# Patient Record
Sex: FEMALE | Race: BLACK OR AFRICAN AMERICAN | NOT HISPANIC OR LATINO | URBAN - METROPOLITAN AREA
[De-identification: names, ages, dates, MRNs, and addresses within clinical notes are randomized per-mention and may not be internally consistent; named-entity substitution may affect disease eponyms.]

---

## 2024-11-18 ENCOUNTER — TELEPHONE (OUTPATIENT)
Dept: OBSTETRICS AND GYNECOLOGY | Facility: CLINIC | Age: 34
End: 2024-11-18

## 2024-11-18 NOTE — TELEPHONE ENCOUNTER
Pt called in regards to New Pregnancy Appt.  LMP 10/21/24  assist with my chart set up. Appt scheduled     Jenni LIM LPN  OB/GYN

## 2024-11-18 NOTE — TELEPHONE ENCOUNTER
----- Message from Sussy sent at 11/18/2024 10:17 AM CST -----  Contact: self  [phone]  Type:  Sooner Apoointment Request    Caller is requesting a sooner appointment.  Caller declined first available appointment listed below.  Caller will not accept being placed on the waitlist and is requesting a message be sent to doctor.  Name of Caller:Clementina  When is the first available appointment?none populated  Symptoms:confirm pregnancy  Would the patient rather a call back or a response via [facility]?   Best Call Back Number: [phone]  Additional Information:

## 2024-11-20 ENCOUNTER — CLINICAL SUPPORT (OUTPATIENT)
Dept: OBSTETRICS AND GYNECOLOGY | Facility: CLINIC | Age: 34
End: 2024-11-20
Payer: COMMERCIAL

## 2024-11-20 DIAGNOSIS — Z32.00 POSSIBLE PREGNANCY: Primary | ICD-10-CM

## 2024-11-20 PROCEDURE — 99999 PR PBB SHADOW E&M-EST. PATIENT-LVL I: CPT | Mod: PBBFAC,,,

## 2024-11-20 NOTE — PROGRESS NOTES
Pt confirmed positive UPT/LMP. Upon reviewing locations for scheduling of initial visit pt advised she intended to be scheduled at a clinic in the [facility]. Gave pt the contact number for that region and disconnected call.

## 2024-11-21 ENCOUNTER — TELEPHONE (OUTPATIENT)
Dept: OBSTETRICS AND GYNECOLOGY | Facility: CLINIC | Age: 34
End: 2024-11-21

## 2024-11-21 NOTE — TELEPHONE ENCOUNTER
----- Message from Mani Trinh sent at 11/20/2024  9:01 AM CST -----  Contact: Clementina [facility] does not take Medicaid.     Ziggy  ----- Message -----  From: Maribel Pete  Sent: 11/20/2024   8:57 AM CST  To: #    Patient is calling to set up an initial ob appt. Reports being 5 weeks pregnant, no preference in provider. Pt can be reached at .[phone].

## 2024-12-05 ENCOUNTER — TELEPHONE (OUTPATIENT)
Dept: OBSTETRICS AND GYNECOLOGY | Facility: CLINIC | Age: 34
End: 2024-12-05